# Patient Record
Sex: FEMALE | Race: WHITE
[De-identification: names, ages, dates, MRNs, and addresses within clinical notes are randomized per-mention and may not be internally consistent; named-entity substitution may affect disease eponyms.]

---

## 2019-03-20 ENCOUNTER — HOSPITAL ENCOUNTER (INPATIENT)
Dept: HOSPITAL 54 - ER | Age: 59
LOS: 3 days | Discharge: HOME | DRG: 264 | End: 2019-03-23
Attending: NURSE PRACTITIONER | Admitting: INTERNAL MEDICINE
Payer: SELF-PAY

## 2019-03-20 VITALS — BODY MASS INDEX: 32.87 KG/M2 | WEIGHT: 204.5 LBS | HEIGHT: 66 IN

## 2019-03-20 VITALS — SYSTOLIC BLOOD PRESSURE: 166 MMHG | DIASTOLIC BLOOD PRESSURE: 96 MMHG

## 2019-03-20 DIAGNOSIS — E66.9: ICD-10-CM

## 2019-03-20 DIAGNOSIS — E78.5: ICD-10-CM

## 2019-03-20 DIAGNOSIS — L03.115: ICD-10-CM

## 2019-03-20 DIAGNOSIS — I87.313: Primary | ICD-10-CM

## 2019-03-20 DIAGNOSIS — L97.929: ICD-10-CM

## 2019-03-20 DIAGNOSIS — L03.116: ICD-10-CM

## 2019-03-20 DIAGNOSIS — I87.2: ICD-10-CM

## 2019-03-20 DIAGNOSIS — I10: ICD-10-CM

## 2019-03-20 DIAGNOSIS — I73.9: ICD-10-CM

## 2019-03-20 DIAGNOSIS — L97.919: ICD-10-CM

## 2019-03-20 LAB
ALBUMIN SERPL BCP-MCNC: 3.3 G/DL (ref 3.4–5)
ALP SERPL-CCNC: 75 U/L (ref 46–116)
ALT SERPL W P-5'-P-CCNC: 24 U/L (ref 12–78)
AST SERPL W P-5'-P-CCNC: 22 U/L (ref 15–37)
BASOPHILS # BLD AUTO: 0 /CMM (ref 0–0.2)
BASOPHILS NFR BLD AUTO: 0.3 % (ref 0–2)
BILIRUB DIRECT SERPL-MCNC: 0.1 MG/DL (ref 0–0.2)
BILIRUB SERPL-MCNC: 0.2 MG/DL (ref 0.2–1)
BUN SERPL-MCNC: 15 MG/DL (ref 7–18)
CALCIUM SERPL-MCNC: 9 MG/DL (ref 8.5–10.1)
CHLORIDE SERPL-SCNC: 103 MMOL/L (ref 98–107)
CO2 SERPL-SCNC: 29 MMOL/L (ref 21–32)
CREAT SERPL-MCNC: 0.8 MG/DL (ref 0.6–1.3)
EOSINOPHIL NFR BLD AUTO: 5.6 % (ref 0–6)
GLUCOSE SERPL-MCNC: 107 MG/DL (ref 74–106)
HCT VFR BLD AUTO: 42 % (ref 33–45)
HGB BLD-MCNC: 14.1 G/DL (ref 11.5–14.8)
LYMPHOCYTES NFR BLD AUTO: 1.1 /CMM (ref 0.8–4.8)
LYMPHOCYTES NFR BLD AUTO: 14.6 % (ref 20–44)
MCHC RBC AUTO-ENTMCNC: 33 G/DL (ref 31–36)
MCV RBC AUTO: 95 FL (ref 82–100)
MONOCYTES NFR BLD AUTO: 0.5 /CMM (ref 0.1–1.3)
MONOCYTES NFR BLD AUTO: 6.3 % (ref 2–12)
NEUTROPHILS # BLD AUTO: 5.6 /CMM (ref 1.8–8.9)
NEUTROPHILS NFR BLD AUTO: 73.2 % (ref 43–81)
NT-PROBNP SERPL-MCNC: 250 PG/ML (ref 0–125)
PLATELET # BLD AUTO: 259 /CMM (ref 150–450)
POTASSIUM SERPL-SCNC: 3.1 MMOL/L (ref 3.5–5.1)
PROT SERPL-MCNC: 9.2 G/DL (ref 6.4–8.2)
RBC # BLD AUTO: 4.45 MIL/UL (ref 4–5.2)
SODIUM SERPL-SCNC: 140 MMOL/L (ref 136–145)
WBC NRBC COR # BLD AUTO: 7.7 K/UL (ref 4.3–11)

## 2019-03-20 PROCEDURE — G0378 HOSPITAL OBSERVATION PER HR: HCPCS

## 2019-03-20 PROCEDURE — A4217 STERILE WATER/SALINE, 500 ML: HCPCS

## 2019-03-20 PROCEDURE — A6403 STERILE GAUZE>16 <= 48 SQ IN: HCPCS

## 2019-03-20 PROCEDURE — A6253 ABSORPT DRG > 48 SQ IN W/O B: HCPCS

## 2019-03-20 NOTE — NUR
ADMISSION NOTES:

RECEIVED REPORT FROM ABBY SALINAS RN. PT BROUGHT TO THE UNIT VIA GURNEY. PT A/O X3 Icelandic 
SPEAKING ONLY, MET WITH SON AT BED SIDE. ORIENTED PT TO UNIT POLICY AND HOURLY ROUNDING, USE 
OF CALL LIGHT. INVENTORY OF BELONGINGS COMPLETED BY ROSALINE HOLDER. SKIN ASSESSMENT 
PERFORMED,PLEASE SEE SKIN ASSESSMENT LOG. INITIAL WOUND CARE PERFORMED, WOUND CONSULT 
OBTAINED. BLE OFFLOADED. VS TAKEN AND RECORDED. PT UNABLE TO TAKE REMAINING POTASSIUM PILLS. 
SAFETY PRECAUTIONS FOR FALL INITIATED, CALL LIGHT IN REACH, WILL CONTINUE MONITORING PT.

## 2019-03-21 VITALS — DIASTOLIC BLOOD PRESSURE: 56 MMHG | SYSTOLIC BLOOD PRESSURE: 123 MMHG

## 2019-03-21 VITALS — DIASTOLIC BLOOD PRESSURE: 63 MMHG | SYSTOLIC BLOOD PRESSURE: 111 MMHG

## 2019-03-21 VITALS — SYSTOLIC BLOOD PRESSURE: 122 MMHG | DIASTOLIC BLOOD PRESSURE: 71 MMHG

## 2019-03-21 VITALS — DIASTOLIC BLOOD PRESSURE: 39 MMHG | SYSTOLIC BLOOD PRESSURE: 101 MMHG

## 2019-03-21 LAB
BASOPHILS # BLD AUTO: 0 /CMM (ref 0–0.2)
BASOPHILS NFR BLD AUTO: 0.3 % (ref 0–2)
BUN SERPL-MCNC: 17 MG/DL (ref 7–18)
CALCIUM SERPL-MCNC: 8.5 MG/DL (ref 8.5–10.1)
CHLORIDE SERPL-SCNC: 104 MMOL/L (ref 98–107)
CHOLEST SERPL-MCNC: 131 MG/DL (ref ?–200)
CO2 SERPL-SCNC: 25 MMOL/L (ref 21–32)
CREAT SERPL-MCNC: 1.4 MG/DL (ref 0.6–1.3)
EOSINOPHIL NFR BLD AUTO: 0.2 % (ref 0–6)
GLUCOSE SERPL-MCNC: 153 MG/DL (ref 74–106)
HCT VFR BLD AUTO: 40 % (ref 33–45)
HDLC SERPL-MCNC: 46 MG/DL (ref 40–60)
HGB BLD-MCNC: 13.5 G/DL (ref 11.5–14.8)
IRON SERPL-MCNC: 20 UG/DL (ref 50–175)
LDLC SERPL DIRECT ASSAY-MCNC: 81 MG/DL (ref 0–99)
LYMPHOCYTES NFR BLD AUTO: 0.2 /CMM (ref 0.8–4.8)
LYMPHOCYTES NFR BLD AUTO: 1.6 % (ref 20–44)
MAGNESIUM SERPL-MCNC: 1.8 MG/DL (ref 1.8–2.4)
MCHC RBC AUTO-ENTMCNC: 34 G/DL (ref 31–36)
MCV RBC AUTO: 94 FL (ref 82–100)
MONOCYTES NFR BLD AUTO: 0.1 /CMM (ref 0.1–1.3)
MONOCYTES NFR BLD AUTO: 1.4 % (ref 2–12)
NEUTROPHILS # BLD AUTO: 10 /CMM (ref 1.8–8.9)
NEUTROPHILS NFR BLD AUTO: 96.5 % (ref 43–81)
PHOSPHATE SERPL-MCNC: 2.6 MG/DL (ref 2.5–4.9)
PLATELET # BLD AUTO: 194 /CMM (ref 150–450)
POTASSIUM SERPL-SCNC: 3.3 MMOL/L (ref 3.5–5.1)
RBC # BLD AUTO: 4.29 MIL/UL (ref 4–5.2)
SODIUM SERPL-SCNC: 141 MMOL/L (ref 136–145)
TIBC SERPL-MCNC: 255 UG/DL (ref 250–450)
TRIGL SERPL-MCNC: 58 MG/DL (ref 30–150)
TSH SERPL DL<=0.005 MIU/L-ACNC: 1.14 UIU/ML (ref 0.36–3.74)
WBC NRBC COR # BLD AUTO: 10.3 K/UL (ref 4.3–11)

## 2019-03-21 PROCEDURE — 0JBP0ZZ EXCISION OF LEFT LOWER LEG SUBCUTANEOUS TISSUE AND FASCIA, OPEN APPROACH: ICD-10-PCS | Performed by: PODIATRIST

## 2019-03-21 PROCEDURE — 0JBN0ZZ EXCISION OF RIGHT LOWER LEG SUBCUTANEOUS TISSUE AND FASCIA, OPEN APPROACH: ICD-10-PCS | Performed by: PODIATRIST

## 2019-03-21 RX ADMIN — DEXTROSE MONOHYDRATE SCH MLS/HR: 50 INJECTION, SOLUTION INTRAVENOUS at 10:22

## 2019-03-21 RX ADMIN — PIPERACILLIN SODIUM AND TAZOBACTAM SODIUM SCH MLS/HR: .375; 3 INJECTION, POWDER, LYOPHILIZED, FOR SOLUTION INTRAVENOUS at 18:57

## 2019-03-21 RX ADMIN — PIPERACILLIN SODIUM AND TAZOBACTAM SODIUM SCH MLS/HR: .375; 3 INJECTION, POWDER, LYOPHILIZED, FOR SOLUTION INTRAVENOUS at 12:15

## 2019-03-21 RX ADMIN — DEXTROSE MONOHYDRATE SCH MLS/HR: 50 INJECTION, SOLUTION INTRAVENOUS at 21:49

## 2019-03-21 RX ADMIN — LISINOPRIL SCH MG: 10 TABLET ORAL at 09:24

## 2019-03-21 RX ADMIN — ENOXAPARIN SODIUM SCH MG: 40 INJECTION SUBCUTANEOUS at 00:17

## 2019-03-21 RX ADMIN — ASPIRIN 81 MG SCH MG: 81 TABLET ORAL at 09:24

## 2019-03-21 RX ADMIN — ENOXAPARIN SODIUM SCH MG: 40 INJECTION SUBCUTANEOUS at 21:49

## 2019-03-21 NOTE — NUR
MS/RN OPENING NOTES



PT RECEIVED ASLEEP, DAUGHTER AT BEDSIDE. OPENS EYES TO NAME. A/OX3. ON ROOM AIR, BREATHING 
EVEN AND UNLABORED. DENIES SOB AND PAIN. IN NO ACUTE DISTRESS. IV TO LEFT HAND PATENT AND 
INTACT. BILATERAL LEG DRESSINGS C/D/I. BED IN LOW/LOCKED POSITION WITH CALL LIGHT IN REACH, 
HOB ELEVATED. BILATERAL UPPER SIDE RAILS IN PLACE. WILL CONTINUE TO MONITOR

## 2019-03-21 NOTE — NUR
PRN ZOFRAN:

PT NAUSEATED, REQUESTING FOR MEDICATION, PRN ZOFRAN 4MG IVP ADMINISTERED AT THIS TIME. WILL 
CONTINUE TO MONITOR AND REASSES

## 2019-03-21 NOTE — NUR
RN NOTES:

RECHECK PT'S VS, NOTED TEMP 101.5, GIVEN TYLENOL AT 0053, COOLING MEASURES PROVIDED, REMOVED 
EXCESS THICK BLANKET, WILL RECHECK TEMP AGAIN

## 2019-03-21 NOTE — NUR
MS/RN NOTES



PT SITTING IN THE CHAIR. NEW LINENS PROVIDED. IV TO LEFT HAND INFILTRATED. ASSISTED PT BACK 
TO BED. NEW IV INSERTED TO RIGHT HAND #22, WITH GOOD BLOOD RETURN AND FLUSHES WELL. 
VANCOMYCIN INFUSING

## 2019-03-21 NOTE — NUR
PATIENT S/P DEBRIDEMENT AT BED SIDE, DRESSING IS CLEAN. IV ACCESS REMAINS PATENT AND 
FLUSHING WELL HL.VS ARE STABLE, NO FEVER. SAFETY PRECAUTIONS FOR FALL OBSERVED, CALL LIGHT 
IN REACH, WILL ENDORSE TO NEXT SHIFT FOR CONTINUITY OF CARE.

## 2019-03-21 NOTE — NUR
RN NOTES:

NEW ORDER FOR ZOSYN 4.5GM IVPB, MEDICATION OVERRIDE BY RN PREETI PATIÑO, MEDICATION AVAILABLE 
IS 2.25GM IV ZOSYN, (2) 2.25GM ZOSYN WILL BE ADMINISTER AT THIS TIME

## 2019-03-21 NOTE — NUR
RN CLOSING NOTES:

PT REMAINS ON RA, DENIES ANY PAIN OR DISCOMFORT. LATEST TEMP 99. IV ACCESS REMAINS PATENT 
AND FLUSHING WELL, ON HL.  BLE DRESSING REMAIN C/D/I, NO ACTIVE BLEEDING NOTED. FOR WOUND 
AND SURGICAL CONSULT TODAY. VS REMAINS STABLE, NEEDS ATTENDED. SAFETY PRECAUTIONS FOR FALL 
REMAINS ENGAGED, CALL LIGHT IN REACH, WILL ENDORSE TO DAY RN FOR CONTINUITY OF CARE.

## 2019-03-21 NOTE — NUR
RN NOTES:

MD CURRENTLY IN THE UNIT, INFORM ABOUT PT'S BEING NAUSEATED AND DOES NOT WANT TO TAKE THE 40 
MEQ POTASSIUM, PER MD ITS OKAY NOT TO GIVE IT. MD MADE AWARE ABOUT PT'S TEMP 100.8 ZOFRAN 
FOR NAUSEA JUST ADMINISTERED, PER MD TO GIVE TYLENOL LATER WHEN NAUSEA SUBSIDES.

## 2019-03-22 VITALS — DIASTOLIC BLOOD PRESSURE: 70 MMHG | SYSTOLIC BLOOD PRESSURE: 130 MMHG

## 2019-03-22 VITALS — SYSTOLIC BLOOD PRESSURE: 139 MMHG | DIASTOLIC BLOOD PRESSURE: 75 MMHG

## 2019-03-22 VITALS — DIASTOLIC BLOOD PRESSURE: 67 MMHG | SYSTOLIC BLOOD PRESSURE: 116 MMHG

## 2019-03-22 VITALS — SYSTOLIC BLOOD PRESSURE: 130 MMHG | DIASTOLIC BLOOD PRESSURE: 70 MMHG

## 2019-03-22 LAB
BUN SERPL-MCNC: 14 MG/DL (ref 7–18)
CALCIUM SERPL-MCNC: 8.4 MG/DL (ref 8.5–10.1)
CHLORIDE SERPL-SCNC: 105 MMOL/L (ref 98–107)
CO2 SERPL-SCNC: 28 MMOL/L (ref 21–32)
CREAT SERPL-MCNC: 1.1 MG/DL (ref 0.6–1.3)
GLUCOSE SERPL-MCNC: 117 MG/DL (ref 74–106)
POTASSIUM SERPL-SCNC: 3.4 MMOL/L (ref 3.5–5.1)
SODIUM SERPL-SCNC: 141 MMOL/L (ref 136–145)

## 2019-03-22 RX ADMIN — PIPERACILLIN SODIUM AND TAZOBACTAM SODIUM SCH MLS/HR: .375; 3 INJECTION, POWDER, LYOPHILIZED, FOR SOLUTION INTRAVENOUS at 12:21

## 2019-03-22 RX ADMIN — DEXTROSE MONOHYDRATE SCH MLS/HR: 50 INJECTION, SOLUTION INTRAVENOUS at 21:35

## 2019-03-22 RX ADMIN — ASPIRIN 81 MG SCH MG: 81 TABLET ORAL at 08:32

## 2019-03-22 RX ADMIN — PIPERACILLIN SODIUM AND TAZOBACTAM SODIUM SCH MLS/HR: .375; 3 INJECTION, POWDER, LYOPHILIZED, FOR SOLUTION INTRAVENOUS at 00:30

## 2019-03-22 RX ADMIN — ENOXAPARIN SODIUM SCH MG: 40 INJECTION SUBCUTANEOUS at 21:35

## 2019-03-22 RX ADMIN — PIPERACILLIN SODIUM AND TAZOBACTAM SODIUM SCH MLS/HR: .375; 3 INJECTION, POWDER, LYOPHILIZED, FOR SOLUTION INTRAVENOUS at 06:27

## 2019-03-22 RX ADMIN — DEXTROSE MONOHYDRATE SCH MLS/HR: 50 INJECTION, SOLUTION INTRAVENOUS at 10:21

## 2019-03-22 RX ADMIN — LISINOPRIL SCH MG: 10 TABLET ORAL at 08:32

## 2019-03-22 RX ADMIN — PIPERACILLIN SODIUM AND TAZOBACTAM SODIUM SCH MLS/HR: .375; 3 INJECTION, POWDER, LYOPHILIZED, FOR SOLUTION INTRAVENOUS at 18:25

## 2019-03-22 NOTE — NUR
MS/RN OPENING NOTES



PT RECEIVED AWAKE, FAMILY AT BEDSIDE. A/OX3. ON ROOM AIR, BREATHING EVEN AND UNLABORED. 
DENIES SOB AND PAIN AT THIS TIME. IN NO ACUTE DISTRESS. DRESSINGS TO BLE C/D/I. IV TO RIGHT 
HAND PATENT AND INTACT. NO NEEDS EXPRESSED AT THIS TIME. BED IN LOW/LOCKED POSITION WITH 
CALL LIGHT IN REACH. BILATERAL UPPER SIDE RAILS IN PLACE AND HOB ELEVATED. WILL CONTINUE TO 
MONITOR

## 2019-03-22 NOTE — NUR
MS/RN CLOSING NOTES



PT AWAKE, RESTING COMFORTABLY IN BED. ON ROOM AIR, BREATHING EVEN AND UNLABORED. DAUGHTER 
REMAINS AT BEDSIDE. DENIES SOB AND PAIN AT THIS TIME. DRESSINGS TO BLE C/D/I. IV TO RIGHT 
HAND PATENT AND INTACT. NO SIGNIFICANT CHANGES OVERNIGHT. SLEPT WELL DURING SHIFT. ALL NEEDS 
MET. BED REMAINS IN LOW/LOCKED POSITION WITH CALL LIGHT IN REACH, HOB ELEVATED AND BILATERAL 
UPPER SIDE RAILS IN PLACE. ENDORSED TO DAY SHIFT RN TUNDE.

## 2019-03-22 NOTE — NUR
WOUND CARE CONSULT   WOUND CARE RECEIVED CONSULT FOR WOUNDS.  WOUND CARE WILL DEFER CONSULT 
AND ALL TREATMENT PLANS TO DPM DR GRANADOS WHO IS CURRENTLY FOLLOWING THIS PATIENT.  
PATIENT WITH PRIYANKA AT 18, ALL PRESSURE ULCER PREVENTION MEASURES ARE NOTED TO BE IN PLACE.  
WILL SEE PRN.

## 2019-03-22 NOTE — NUR
PT AWAKE, RESTING IN BED. ON ROOM AIR, BREATHING EVEN AND UNLABORED.SON AT BEDSIDE. 
DRESSINGS TO BLE INTACT. IV TO RIGHT HAND PATENT AND INTACT. ALL NEEDS MET. BED  IN 
LOW/LOCKED POSITION WITH CALL LIGHT IN REACH, SIDE RAILS X2 UP ENDORSED TO NEXT SHIFT RN 
TUNDE.

## 2019-03-22 NOTE — NUR
MS/RN NOTES



PT WITH EYES CLOSED, OPENS EYES TO BEDSIDE NOISE. DAUGHTER REMAINS AT BEDSIDE. NO NEEDS 
VERBALIZED AT THIS TIME.

## 2019-03-22 NOTE — NUR
PT ASLEEP, FAMILY MEMBER AT BEDSIDE. NO NEEDS ANTICIPATED AT THIS TIME. BREATHING EVEN AND 
UNLABORED. IN NO ACUTE DISTRESS.

## 2019-03-23 VITALS — DIASTOLIC BLOOD PRESSURE: 82 MMHG | SYSTOLIC BLOOD PRESSURE: 148 MMHG

## 2019-03-23 VITALS — SYSTOLIC BLOOD PRESSURE: 109 MMHG | DIASTOLIC BLOOD PRESSURE: 60 MMHG

## 2019-03-23 LAB
BUN SERPL-MCNC: 10 MG/DL (ref 7–18)
CALCIUM SERPL-MCNC: 8.4 MG/DL (ref 8.5–10.1)
CHLORIDE SERPL-SCNC: 105 MMOL/L (ref 98–107)
CO2 SERPL-SCNC: 24 MMOL/L (ref 21–32)
CREAT SERPL-MCNC: 1 MG/DL (ref 0.6–1.3)
GLUCOSE SERPL-MCNC: 126 MG/DL (ref 74–106)
POTASSIUM SERPL-SCNC: 3.5 MMOL/L (ref 3.5–5.1)
SODIUM SERPL-SCNC: 140 MMOL/L (ref 136–145)

## 2019-03-23 RX ADMIN — PIPERACILLIN SODIUM AND TAZOBACTAM SODIUM SCH MLS/HR: .375; 3 INJECTION, POWDER, LYOPHILIZED, FOR SOLUTION INTRAVENOUS at 13:07

## 2019-03-23 RX ADMIN — PIPERACILLIN SODIUM AND TAZOBACTAM SODIUM SCH MLS/HR: .375; 3 INJECTION, POWDER, LYOPHILIZED, FOR SOLUTION INTRAVENOUS at 00:22

## 2019-03-23 RX ADMIN — ASPIRIN 81 MG SCH MG: 81 TABLET ORAL at 08:44

## 2019-03-23 RX ADMIN — DEXTROSE MONOHYDRATE SCH MLS/HR: 50 INJECTION, SOLUTION INTRAVENOUS at 09:05

## 2019-03-23 RX ADMIN — PIPERACILLIN SODIUM AND TAZOBACTAM SODIUM SCH MLS/HR: .375; 3 INJECTION, POWDER, LYOPHILIZED, FOR SOLUTION INTRAVENOUS at 06:07

## 2019-03-23 RX ADMIN — LISINOPRIL SCH MG: 10 TABLET ORAL at 08:45

## 2019-03-23 NOTE — NUR
MS/RN CLOSING NOTES



PT ASLEEP. FAMILY MEMBER AT BEDSIDE. ON ROOM AIR, BREATHING EVEN AND UNLABORED. DENIES SOB 
AND PAIN AT THIS TIME. IN NO ACUTE DISTRESS. DRESSING TO BLE C/D/I. IV TO RIGHT HAND PATENT 
AND INTACT. NO SIGNIFICANT CHANGES OVERNIGHT. KEPT COMFORTABLE DURING SHIFT. BED IN 
LOW/LOCKED POSITION WITH CALL LIGHT IN REACH. HOB ELEVATED. BILATERAL UPPER SIDE RAILS IN 
PLACE. WILL ENDORSE TO DAY SHIFT RN TUNDE.

## 2019-03-23 NOTE — NUR
RN NOTES

PATIENT A/OX4, BREATHING EVEN AND UNLABORED, NO SOB NOTED, GRANDSON AT BEDSIDE, DENIES PAIN 
OR DISCOMFORT NOTED, NEEDS ATTENDED, CALL LIGHT WITHIN REACH, WILL CONTINUE TO MONITOR.

## 2019-03-23 NOTE — NUR
DISCHARGE

PT DISCHARGED TO HOME LEAVING WITH SON AND SPOUSE ACI GIVEN ALONG WITH PRESCRIPTION AND 
[facility] RESOURCES FOR WOUND CLINIC FOLLOW UP. WOUND DRESSING CHANGED BEFORE DISCHARGE PIV 
REMOVED WITH TIP INTACT. PT AND FAMILY UNDERSTAND PLAN OF CARE AND WILL FOLLOW UP PT TKAEN 
OUT TO CAR BY WHEEL CHAIR AND SAFETLY PLACED INTO VEHICLE